# Patient Record
Sex: FEMALE
[De-identification: names, ages, dates, MRNs, and addresses within clinical notes are randomized per-mention and may not be internally consistent; named-entity substitution may affect disease eponyms.]

---

## 2020-11-10 ENCOUNTER — NON-APPOINTMENT (OUTPATIENT)
Age: 25
End: 2020-11-10

## 2020-11-10 PROBLEM — Z00.00 ENCOUNTER FOR PREVENTIVE HEALTH EXAMINATION: Status: ACTIVE | Noted: 2020-11-10

## 2020-11-18 ENCOUNTER — NON-APPOINTMENT (OUTPATIENT)
Age: 25
End: 2020-11-18

## 2020-11-19 ENCOUNTER — APPOINTMENT (OUTPATIENT)
Dept: BREAST CENTER | Facility: CLINIC | Age: 25
End: 2020-11-19
Payer: COMMERCIAL

## 2020-11-19 VITALS
BODY MASS INDEX: 21.77 KG/M2 | OXYGEN SATURATION: 96 % | WEIGHT: 108 LBS | HEART RATE: 70 BPM | DIASTOLIC BLOOD PRESSURE: 78 MMHG | HEIGHT: 59 IN | TEMPERATURE: 98 F | SYSTOLIC BLOOD PRESSURE: 119 MMHG

## 2020-11-19 DIAGNOSIS — Z78.9 OTHER SPECIFIED HEALTH STATUS: ICD-10-CM

## 2020-11-19 PROCEDURE — 99204 OFFICE O/P NEW MOD 45 MIN: CPT

## 2020-11-30 NOTE — PAST MEDICAL HISTORY
[Menarche Age ____] : age at menarche was [unfilled] [Definite ___ (Date)] : the last menstrual period was [unfilled] [Regular Cycle Intervals] : have been regular [Total Preg ___] : G[unfilled] [History of Hormone Replacement Treatment] : has no history of hormone replacement treatment [FreeTextEntry7] : currently using Sprintec (OCP)

## 2020-11-30 NOTE — DATA REVIEWED
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 VUS identified in BARD1.

## 2020-11-30 NOTE — HISTORY OF PRESENT ILLNESS
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 Testing was prompted as her mother had breast cancer at age 33, with multiple recurrences and DOD in her 50's. The patient has not had genetic counseling since testing was completed in March 2020. Pt denies masses, lesions, discharge, or other breast complaints. Denies fever and chills.\par \par Discussed patients high risk status and recommendations for close surveillance. Patient understands and would like to be followed closely. Discussed benefits of surveillance and well as implication of the sensitivity of the MRI. Patient understands and agrees to go forward with MRI for breast cancer surveillance.\par \par \par Discussed risk reducing options. This includes medical oncology consult and possible medication that would lower risk by 50%. As well as risk reducing surgery, discussed the risks and benefits of risk reducing breast surgery including the decrease of breast cancer risk to ~3-5%, length of recover and wound healing complications. Patient understands and would like to move forward with surveillance, is interested in prophylactic surgery and would like to meet with plastic surgery to discuss reconstruction options. \par \par Discussed meeting with a fertility specialist for egg harvesting. Patient would like to schedule a consult. \par \par Discussed her increased risk for thyroid, colon, ovarian and other cancers. Patient understands that she will need to establish with a PCP and start screening as recommended. \par \par Patient would also like to meet with a genetic counselor to have an in depth discussion about her genetic history.\par \par

## 2020-12-18 ENCOUNTER — APPOINTMENT (OUTPATIENT)
Dept: PEDIATRIC MEDICAL GENETICS | Facility: CLINIC | Age: 25
End: 2020-12-18
Payer: COMMERCIAL

## 2020-12-18 PROCEDURE — 99215 OFFICE O/P EST HI 40 MIN: CPT | Mod: 95

## 2021-01-05 ENCOUNTER — TRANSCRIPTION ENCOUNTER (OUTPATIENT)
Age: 26
End: 2021-01-05

## 2021-01-12 ENCOUNTER — APPOINTMENT (OUTPATIENT)
Dept: PLASTIC SURGERY | Facility: CLINIC | Age: 26
End: 2021-01-12
Payer: COMMERCIAL

## 2021-01-12 VITALS — HEART RATE: 58 BPM | WEIGHT: 108 LBS | OXYGEN SATURATION: 98 % | BODY MASS INDEX: 21.77 KG/M2 | HEIGHT: 59 IN

## 2021-01-12 DIAGNOSIS — Z42.1 ENCOUNTER FOR BREAST RECONSTRUCTION FOLLOWING MASTECTOMY: ICD-10-CM

## 2021-01-12 PROCEDURE — 99244 OFF/OP CNSLTJ NEW/EST MOD 40: CPT

## 2021-01-12 PROCEDURE — 99072 ADDL SUPL MATRL&STAF TM PHE: CPT

## 2021-01-12 NOTE — ASSESSMENT
[FreeTextEntry1] : I reviewed with Ms. HAND  in detail the risks, benefits, and alternatives of both implant based breast reconstruction as well as autologous tissue reconstruction. \par \par I reviewed with her in detail the risks, benefits, and alternatives of both implant based breast reconstruction as well as autologous tissue reconstruction. Specifically, I explained to her that an implant reconstruction usually consists of two separate stages with two surgeries spaced about 4 months apart. At the time of the mastectomy, a tissue expander is placed underneath the pectoralis muscle or on op of the pectoralis muscle and is often reinforced with biologic mesh - acellular dermal matrix. We expand the tissue expander secondarily in the office by injecting saline into the implant percutaneously until the desired size breast mound is achieved. At that point a second stage operation is scheduled where we take her back to operating room and remove the tissue expander and place a permanent breast implant.  The permanent prosthesis can be either silicone or saline. The first stage of the reconstruction is approximately 3 hours for one breast and 4-5 hours for a bilateral procedure, with a 1-2 night hospital stay and a four-week recovery.  The second stage surgery is an outpatient procedure with a two-week recovery. I reviewed with her the risks of implant reconstruction including but not limited to bleeding, scarring, implant infection, implant rupture, capsule contracture, implant malposition, asymmetry, contour abnormality, and need for revision surgeries. I also discussed the FDA recommendations for silicone implant rupture screening with MRI. \par \par I also explained that there is a possibility of contour abnormality, asymmetry between the two breasts, and possible need for revision surgery. A surgery on the native contralateral breast to achieve symmetry in the setting of a unilateral mastectomy is usually required and often performed at the time of the implant exchange or nipple reconstruction. The nipple areolar reconstruction is performed at a later date as a separate procedure.\par \par I then reviewed with her the details of autologous tissue reconstruction, specifically using a free flap from her buttocks or upper posterior thigh / inferior gluteal crease region. I reviewed the risks, benefits, and alternatives of a profunda artery  (PAP) flap reconstruction (Or GAP flap)  Specifically, I explained to her that we transplant the skin, the fat, and the blood vessels from the buttocks or posterior upper thigh to the chest wall where we reconnect the artery and the vein using microvascular surgical techniques.This operation is approximately six hours and nine hours for both sides. There is a three day hospitalization and a six-week recovery period.  The risks include of free flap failure, vascular compromise requiring return to the operating room, donor site scarring and potential delayed wound healing, wound dehiscence, suboptimal scarring or asymmetry associated with the donor site.  There is also a possibility of contour abnormality and asymmetry between the two breasts. Finally, nipple areola reconstruction is performed at a later date as a separate procedure. If there is a free flap loss we would likely place a tissue expander at the time returning to the operating room in order to preserve the breast skin envelope and perform a delayed reconstruction.\par

## 2021-01-12 NOTE — CONSULT LETTER
[Consult Letter:] : I had the pleasure of evaluating your patient, [unfilled]. [Please see my note below.] : Please see my note below. [Consult Closing:] : Thank you very much for allowing me to participate in the care of this patient.  If you have any questions, please do not hesitate to contact me. [Sincerely,] : Sincerely, [FreeTextEntry2] : Dr. Sadia Whalen [FreeTextEntry3] : Oren Lerman, MD, FACS\par Cosmetic & Reconstructive Plastic Surgery\par Director, Aesthetic and Reconstructive Breast Surgery Fellowship - [facility]\par Associate Professor of Surgery, [facility]\par Past President, [place] [employer]\par Tel: [phone]\par Fax: [phone]\par [email or web address]\par

## 2021-01-12 NOTE — HISTORY OF PRESENT ILLNESS
[FreeTextEntry1] : 24 y/o CHEK2 positive F and family hx (mom) breast ca referred by Dr. Sadia Whalen to discuss breast reconstruction options following b/l prophylactic mastectomy. Denies any personal hx of breast issues. Her mother was dx with breast cancer at age 33. Mammogram and u/s scheduled for next month. On high risk screening protocol starting now as per Dr. Whalen. \par \par She is a high school social studies

## 2021-01-12 NOTE — PHYSICAL EXAM
[Bra Size: _______] : Bra Size: [unfilled] [de-identified] : small breast mounds, no ptosis, BD 12cm B/L , SN-N 18cm bl, no masses, no nipple discharge, no scars.  [de-identified] : no excess adiposity or skin laxity

## 2021-02-12 ENCOUNTER — RESULT REVIEW (OUTPATIENT)
Age: 26
End: 2021-02-12

## 2021-02-12 ENCOUNTER — APPOINTMENT (OUTPATIENT)
Dept: MAMMOGRAPHY | Facility: CLINIC | Age: 26
End: 2021-02-12
Payer: COMMERCIAL

## 2021-02-12 ENCOUNTER — APPOINTMENT (OUTPATIENT)
Dept: MRI IMAGING | Facility: CLINIC | Age: 26
End: 2021-02-12
Payer: COMMERCIAL

## 2021-02-12 ENCOUNTER — APPOINTMENT (OUTPATIENT)
Dept: ULTRASOUND IMAGING | Facility: CLINIC | Age: 26
End: 2021-02-12
Payer: COMMERCIAL

## 2021-02-12 ENCOUNTER — OUTPATIENT (OUTPATIENT)
Dept: OUTPATIENT SERVICES | Facility: HOSPITAL | Age: 26
LOS: 1 days | End: 2021-02-12

## 2021-02-12 PROCEDURE — 77063 BREAST TOMOSYNTHESIS BI: CPT | Mod: 26

## 2021-02-12 PROCEDURE — 77049 MRI BREAST C-+ W/CAD BI: CPT | Mod: 26

## 2021-02-12 PROCEDURE — 77067 SCR MAMMO BI INCL CAD: CPT | Mod: 26

## 2021-02-12 PROCEDURE — 76641 ULTRASOUND BREAST COMPLETE: CPT | Mod: 26,50

## 2021-02-16 ENCOUNTER — NON-APPOINTMENT (OUTPATIENT)
Age: 26
End: 2021-02-16

## 2021-05-11 ENCOUNTER — NON-APPOINTMENT (OUTPATIENT)
Age: 26
End: 2021-05-11

## 2021-05-20 ENCOUNTER — APPOINTMENT (OUTPATIENT)
Dept: BREAST CENTER | Facility: CLINIC | Age: 26
End: 2021-05-20
Payer: COMMERCIAL

## 2021-05-20 VITALS
HEART RATE: 73 BPM | BODY MASS INDEX: 22.78 KG/M2 | OXYGEN SATURATION: 96 % | TEMPERATURE: 98.6 F | WEIGHT: 113 LBS | HEIGHT: 59 IN | SYSTOLIC BLOOD PRESSURE: 105 MMHG | DIASTOLIC BLOOD PRESSURE: 64 MMHG

## 2021-05-20 DIAGNOSIS — R92.8 OTHER ABNORMAL AND INCONCLUSIVE FINDINGS ON DIAGNOSTIC IMAGING OF BREAST: ICD-10-CM

## 2021-05-20 PROCEDURE — 99213 OFFICE O/P EST LOW 20 MIN: CPT

## 2021-05-21 NOTE — HISTORY OF PRESENT ILLNESS
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 Testing was prompted as her mother had breast cancer at age 33, with multiple recurrences and DOD in her 50's. Pt denies masses, lesions, discharge, or other breast complaints. Denies fever and chills.\par \par She met with Dr. Lerman to discuss reconstruction options; the patient would like to hold off on surgery for now.\par \par \par Discussed meeting with a fertility specialist for egg harvesting. This was also discussed at her last appointment but the patient states she never made the appt w/ fertility. She is still interested in a consult.  \par \par Patient met with the genetic counselor through [facility] in December for the CHEK2 & APC mutation mutation\par \par She has not received the COVID vaccination yet, hesitant.

## 2021-05-21 NOTE — DATA REVIEWED
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 VUS identified in BARD1. \par \par -- 2/12/21 ([facility]) MRI breasts: Negative MRI. Given the presence of probably benign mass in the right breast ultrasound, recommend short-term follow-up right breast ultrasound in 6 month. Rec Ultrasound in 6 months. BI-RADS 3\par \par -- 2/12/21 ([facility]) B/l mmg and US: Negative mammogram with extremely dense breasts. Probably benign 1.2 cm solid appearing mass in the right breast at 8:00 position 2 cm from the nipple. No abnormal enhancement is seen on screening MRI performed on the same day. Recommend short-term follow-up right breast ultrasound in 6 month. BI-RADS 3.

## 2021-05-21 NOTE — REASON FOR VISIT
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

## 2021-05-25 ENCOUNTER — NON-APPOINTMENT (OUTPATIENT)
Age: 26
End: 2021-05-25

## 2021-06-08 ENCOUNTER — APPOINTMENT (OUTPATIENT)
Dept: HUMAN REPRODUCTION | Facility: CLINIC | Age: 26
End: 2021-06-08
Payer: COMMERCIAL

## 2021-06-08 PROCEDURE — 99205 OFFICE O/P NEW HI 60 MIN: CPT | Mod: 95

## 2021-08-11 ENCOUNTER — OUTPATIENT (OUTPATIENT)
Dept: OUTPATIENT SERVICES | Facility: HOSPITAL | Age: 26
LOS: 1 days | End: 2021-08-11

## 2021-08-11 ENCOUNTER — RESULT REVIEW (OUTPATIENT)
Age: 26
End: 2021-08-11

## 2021-08-11 ENCOUNTER — APPOINTMENT (OUTPATIENT)
Dept: ULTRASOUND IMAGING | Facility: CLINIC | Age: 26
End: 2021-08-11
Payer: COMMERCIAL

## 2021-08-11 PROCEDURE — 76642 ULTRASOUND BREAST LIMITED: CPT | Mod: 26,RT

## 2021-08-13 ENCOUNTER — NON-APPOINTMENT (OUTPATIENT)
Age: 26
End: 2021-08-13

## 2021-08-16 ENCOUNTER — APPOINTMENT (OUTPATIENT)
Dept: BREAST CENTER | Facility: CLINIC | Age: 26
End: 2021-08-16
Payer: COMMERCIAL

## 2021-08-16 VITALS — OXYGEN SATURATION: 98 % | WEIGHT: 113 LBS | HEART RATE: 74 BPM | HEIGHT: 59 IN | BODY MASS INDEX: 22.78 KG/M2

## 2021-08-16 PROCEDURE — 99213 OFFICE O/P EST LOW 20 MIN: CPT

## 2021-08-18 NOTE — DATA REVIEWED
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 VUS identified in BARD1. \par \par -- 2/12/21 ([facility]) MRI breasts: Negative MRI. Given the presence of probably benign mass in the right breast ultrasound, recommend short-term follow-up right breast ultrasound in 6 month. Rec Ultrasound in 6 months. BI-RADS 3\par \par -- 2/12/21 ([facility]) B/l mmg and US: Negative mammogram with extremely dense breasts. Probably benign 1.2 cm solid appearing mass in the right breast at 8:00 position 2 cm from the nipple. No abnormal enhancement is seen on screening MRI performed on the same day. Recommend short-term follow-up right breast ultrasound in 6 month. BI-RADS 3. \par \par 8/11/2021 ([facility]) targeted US of right breast showing Targeted ultrasound was performed of the right breast demonstrating no interval change in a benign-appearing nodule at 8:00 2 cm from the nipple measuring 12 mm, which meets criteria for additional follow-up. IMPRESSION: Recommend additional short-term interval sonographic follow-up of benign-appearing solid nodule in the right breast at 8:00 2 cm from the nipple, to optimally document two-year stability. Recommendations were relayed directly to the patient prior to discharge. The patient will be notified of the recommendations with a letter written in lay terms, as per [facility] requirements. RECOMMENDATION: Ultrasound in 6 months. BI-RADS 3-Probably Benign Finding(s)

## 2021-08-18 NOTE — HISTORY OF PRESENT ILLNESS
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 Testing was prompted as her mother had breast cancer at age 33, with multiple recurrences and DOD in her 50's. Pt denies masses, lesions, discharge, or other breast complaints. Denies fever and chills.\par \par Had telehealth with Dr. Kelley to discuss options. \par \par Patient met with the genetic counselor through [facility] in December for the CHEK2 & APC mutation mutation\par \par She has not received the COVID vaccination yet, hesitant.

## 2021-12-14 ENCOUNTER — APPOINTMENT (OUTPATIENT)
Dept: PLASTIC SURGERY | Facility: CLINIC | Age: 26
End: 2021-12-14

## 2022-02-21 ENCOUNTER — OUTPATIENT (OUTPATIENT)
Dept: OUTPATIENT SERVICES | Facility: HOSPITAL | Age: 27
LOS: 1 days | End: 2022-02-21

## 2022-02-21 ENCOUNTER — APPOINTMENT (OUTPATIENT)
Dept: ULTRASOUND IMAGING | Facility: CLINIC | Age: 27
End: 2022-02-21
Payer: COMMERCIAL

## 2022-02-21 ENCOUNTER — RESULT REVIEW (OUTPATIENT)
Age: 27
End: 2022-02-21

## 2022-02-21 ENCOUNTER — APPOINTMENT (OUTPATIENT)
Dept: MAMMOGRAPHY | Facility: CLINIC | Age: 27
End: 2022-02-21
Payer: COMMERCIAL

## 2022-02-21 PROCEDURE — 77066 DX MAMMO INCL CAD BI: CPT | Mod: 26

## 2022-02-21 PROCEDURE — 76642 ULTRASOUND BREAST LIMITED: CPT | Mod: 26,RT

## 2022-02-21 PROCEDURE — 77062 BREAST TOMOSYNTHESIS BI: CPT | Mod: 26

## 2022-02-21 PROCEDURE — G0279: CPT | Mod: 26

## 2022-02-22 ENCOUNTER — APPOINTMENT (OUTPATIENT)
Dept: BREAST CENTER | Facility: CLINIC | Age: 27
End: 2022-02-22
Payer: COMMERCIAL

## 2022-02-22 VITALS
SYSTOLIC BLOOD PRESSURE: 110 MMHG | HEIGHT: 59 IN | HEART RATE: 67 BPM | DIASTOLIC BLOOD PRESSURE: 72 MMHG | WEIGHT: 114 LBS | BODY MASS INDEX: 22.98 KG/M2

## 2022-02-22 PROCEDURE — 99213 OFFICE O/P EST LOW 20 MIN: CPT

## 2022-02-24 ENCOUNTER — APPOINTMENT (OUTPATIENT)
Dept: ULTRASOUND IMAGING | Facility: CLINIC | Age: 27
End: 2022-02-24

## 2022-02-24 ENCOUNTER — APPOINTMENT (OUTPATIENT)
Dept: MAMMOGRAPHY | Facility: CLINIC | Age: 27
End: 2022-02-24

## 2022-02-28 NOTE — HISTORY OF PRESENT ILLNESS
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 Testing was prompted as her mother had breast cancer at age 33, with multiple recurrences and DOD in her 50's. Pt denies masses, lesions, discharge, or other breast complaints. Denies fever and chills.\par \par Had telehealth with Dr. Kelley to discuss options. \par \par Patient met with the genetic counselor through [facility] in December for the CHEK2 & APC mutation.\par \par Patient has had COVID 19 vaccine and booster.

## 2022-02-28 NOTE — DATA REVIEWED
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 VUS identified in BARD1. \par \par -- 2/12/21 ([facility]) MRI breasts: Negative MRI. Given the presence of probably benign mass in the right breast ultrasound, recommend short-term follow-up right breast ultrasound in 6 month. Rec Ultrasound in 6 months. BI-RADS 3\par \par -- 2/12/21 ([facility]) B/l mmg and US: Negative mammogram with extremely dense breasts. Probably benign 1.2 cm solid appearing mass in the right breast at 8:00 position 2 cm from the nipple. No abnormal enhancement is seen on screening MRI performed on the same day. Recommend short-term follow-up right breast ultrasound in 6 month. BI-RADS 3. \par \par 8/11/2021 ([facility]) targeted US of right breast showing Targeted ultrasound was performed of the right breast demonstrating no interval change in a benign-appearing nodule at 8:00 2 cm from the nipple measuring 12 mm, which meets criteria for additional follow-up. IMPRESSION: Recommend additional short-term interval sonographic follow-up of benign-appearing solid nodule in the right breast at 8:00 2 cm from the nipple, to optimally document two-year stability. Recommendations were relayed directly to the patient prior to discharge. The patient will be notified of the recommendations with a letter written in lay terms, as per [facility] requirements. RECOMMENDATION: Ultrasound in 6 months. BI-RADS 3-Probably Benign Finding(s)\par \par Data Reviewed:\par \par 2/21/22: ([facility]) ASH MMG/US: The breasts are extremely dense. \par    Recommendation: Mammography and ultrasound in 1 year, to follow up benign appearing solid nodule in the right breast at 8:00 2 cm FN.\par    BI-RADS 3.\par \par

## 2022-08-02 ENCOUNTER — OUTPATIENT (OUTPATIENT)
Dept: OUTPATIENT SERVICES | Facility: HOSPITAL | Age: 27
LOS: 1 days | End: 2022-08-02

## 2022-08-02 ENCOUNTER — APPOINTMENT (OUTPATIENT)
Dept: MRI IMAGING | Facility: CLINIC | Age: 27
End: 2022-08-02

## 2022-08-02 ENCOUNTER — RESULT REVIEW (OUTPATIENT)
Age: 27
End: 2022-08-02

## 2022-08-02 PROCEDURE — 77049 MRI BREAST C-+ W/CAD BI: CPT | Mod: 26

## 2022-08-16 ENCOUNTER — NON-APPOINTMENT (OUTPATIENT)
Age: 27
End: 2022-08-16

## 2022-08-22 ENCOUNTER — APPOINTMENT (OUTPATIENT)
Dept: BREAST CENTER | Facility: CLINIC | Age: 27
End: 2022-08-22

## 2022-08-22 VITALS — HEIGHT: 59 IN | BODY MASS INDEX: 23.18 KG/M2 | HEART RATE: 75 BPM | OXYGEN SATURATION: 97 % | WEIGHT: 115 LBS

## 2022-08-22 PROCEDURE — 99213 OFFICE O/P EST LOW 20 MIN: CPT

## 2022-08-22 RX ORDER — NORGESTIMATE AND ETHINYL ESTRADIOL 0.25-0.035
KIT ORAL
Refills: 0 | Status: DISCONTINUED | COMMUNITY
End: 2022-08-22

## 2022-08-22 RX ORDER — CETIRIZINE HCL 10 MG
TABLET ORAL
Refills: 0 | Status: DISCONTINUED | COMMUNITY
End: 2022-08-22

## 2022-08-22 NOTE — DATA REVIEWED
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 VUS identified in BARD1. \par \par -- 2/12/21 ([facility]) MRI breasts: Negative MRI. Given the presence of probably benign mass in the right breast ultrasound, recommend short-term follow-up right breast ultrasound in 6 month. Rec Ultrasound in 6 months. BI-RADS 3\par \par -- 2/12/21 ([facility]) B/l mmg and US: Negative mammogram with extremely dense breasts. Probably benign 1.2 cm solid appearing mass in the right breast at 8:00 position 2 cm from the nipple. No abnormal enhancement is seen on screening MRI performed on the same day. Recommend short-term follow-up right breast ultrasound in 6 month. BI-RADS 3. \par \par 8/11/2021 ([facility]) targeted US of right breast showing Targeted ultrasound was performed of the right breast demonstrating no interval change in a benign-appearing nodule at 8:00 2 cm from the nipple measuring 12 mm, which meets criteria for additional follow-up. IMPRESSION: Recommend additional short-term interval sonographic follow-up of benign-appearing solid nodule in the right breast at 8:00 2 cm from the nipple, to optimally document two-year stability. Recommendations were relayed directly to the patient prior to discharge. The patient will be notified of the recommendations with a letter written in lay terms, as per [facility] requirements. RECOMMENDATION: Ultrasound in 6 months. BI-RADS 3-Probably Benign Finding(s)\par \par 2/21/22: ([facility]) ASH MMG/US: The breasts are extremely dense. Recommendation: Mammography and ultrasound in 1 year, to follow up benign appearing solid nodule in the right breast at 8:00 2 cm FN. BI-RADS 3.\par \par 8/2/2022 ([facility]) bilateral breast MRI showing No suspicious enhancement to warrant biopsy. RECOMMENDATION: Resume Annual Mammography on Schedule. BI-RADS 2 - Benign Finding(s)

## 2022-08-22 NOTE — HISTORY OF PRESENT ILLNESS
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 Testing was prompted as her mother had breast cancer at age 33, with multiple recurrences and DOD in her 50's. Pt denies masses, lesions, discharge, or other breast complaints. Denies fever and chills.\par \par Had telehealth with Dr. Kelley to discuss options. \par \par Patient met with the genetic counselor through [facility] in December 2021 for the CHEK2 & APC mutation

## 2023-02-16 ENCOUNTER — OUTPATIENT (OUTPATIENT)
Dept: OUTPATIENT SERVICES | Facility: HOSPITAL | Age: 28
LOS: 1 days | End: 2023-02-16

## 2023-02-16 ENCOUNTER — RESULT REVIEW (OUTPATIENT)
Age: 28
End: 2023-02-16

## 2023-02-16 ENCOUNTER — APPOINTMENT (OUTPATIENT)
Dept: MAMMOGRAPHY | Facility: CLINIC | Age: 28
End: 2023-02-16
Payer: COMMERCIAL

## 2023-02-16 ENCOUNTER — APPOINTMENT (OUTPATIENT)
Dept: ULTRASOUND IMAGING | Facility: CLINIC | Age: 28
End: 2023-02-16
Payer: COMMERCIAL

## 2023-02-16 PROCEDURE — 77067 SCR MAMMO BI INCL CAD: CPT | Mod: 26

## 2023-02-16 PROCEDURE — 76641 ULTRASOUND BREAST COMPLETE: CPT | Mod: 26,RT

## 2023-02-16 PROCEDURE — 77063 BREAST TOMOSYNTHESIS BI: CPT | Mod: 26

## 2023-02-17 ENCOUNTER — APPOINTMENT (OUTPATIENT)
Dept: BREAST CENTER | Facility: CLINIC | Age: 28
End: 2023-02-17
Payer: COMMERCIAL

## 2023-02-17 VITALS
OXYGEN SATURATION: 99 % | DIASTOLIC BLOOD PRESSURE: 61 MMHG | HEIGHT: 59 IN | BODY MASS INDEX: 23.18 KG/M2 | WEIGHT: 115 LBS | SYSTOLIC BLOOD PRESSURE: 108 MMHG | HEART RATE: 69 BPM

## 2023-02-17 DIAGNOSIS — Z15.09 GENETIC SUSCEPTIBILITY TO OTHER MALIGNANT NEOPLASM: ICD-10-CM

## 2023-02-17 DIAGNOSIS — Z91.89 OTHER SPECIFIED PERSONAL RISK FACTORS, NOT ELSEWHERE CLASSIFIED: ICD-10-CM

## 2023-02-17 DIAGNOSIS — Z80.3 FAMILY HISTORY OF MALIGNANT NEOPLASM OF BREAST: ICD-10-CM

## 2023-02-17 PROCEDURE — 99213 OFFICE O/P EST LOW 20 MIN: CPT

## 2023-02-17 NOTE — HISTORY OF PRESENT ILLNESS
[FreeTextEntry1] : Glenna is a 27 year old female, presents for high risk screening with apositive pathogenic mutation in CHEK-2 and a low penetrance variant in APC. Testing was prompted as her mother had breast cancer at age 33, with multiple recurrences and DOD in her 50's. Pt denies masses, lesions, discharge, or other breast complaints. Denies fever and chills.\par \kirk Had telehealth with Dr. Kelley to discuss options; has recently come off birth control. Is interested in having children in the future. She is aware that [facility]/fertility services continue to be available to her. She has a gynecologist who performs ovarian cancer screenings with [facility]; she has a PCP that she follows up with regularly.\par \par Patient met with the genetic counselor through [facility] in December 2021 for the CHEK2 & APC mutation\par \par She will be moving to [place] in the next couple of months; may be moving back to [place] in August to be with her partner but that is still to be decided.

## 2023-02-17 NOTE — PHYSICAL EXAM
[Normocephalic] : normocephalic [No Supraclavicular Adenopathy] : no supraclavicular adenopathy [Examined in the supine and seated position] : examined in the supine and seated position [Symmetrical] : symmetrical [No dominant masses] : no dominant masses in right breast  [No dominant masses] : no dominant masses left breast [No Nipple Retraction] : no left nipple retraction [No Nipple Discharge] : no left nipple discharge [Breast Nipple Inversion] : nipples not inverted [Breast Nipple Retraction] : nipples not retracted [Breast Nipple Flattening] : nipples not flattened [Breast Nipple Fissures] : nipples not fissured [No Axillary Lymphadenopathy] : no left axillary lymphadenopathy [No Edema] : no edema [No Rashes] : no rashes [No Ulceration] : no ulceration

## 2023-02-17 NOTE — ASSESSMENT
[FreeTextEntry1] : 26 yo female with a genetic predisposition to cancer (mutations in CHEK2 and APC) present for high risk screening. Discussed with the patient the implications of their lifetime risk, which is considered to be elevated for the development of breast cancer over the span of their lifetime. It was explained that it is recommended that they undergo high risk screening surveillance to include biannual radiological screening exams with a mammogram and screening MRI. Reviewed the standard lifestyle modifications for risk reduction including to limit alcohol intake, follow a low-fat diet, and maintaining a healthy weight.\par \par Patient will schedule MRI for August this summer, when she knows whether or not she will be in [place] or [place] at that time. Will provide her with a script. Advised her to inform us if she needs help scheduling the appointment in [facility], or if she ends up doing it in [place] so that we can get authorization. If she stays in [place] longer term, she will need to establish care with a high risk program there. Patient understands, is in agreement with the plan.

## 2023-02-17 NOTE — REVIEW OF SYSTEMS
[Fever] : no fever [Chills] : no chills [Breast Pain] : no breast pain [Breast Lump] : no breast lump [Negative] : Heme/Lymph

## 2023-02-17 NOTE — DATA REVIEWED
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 VUS identified in BARD1. \par \par -- 2/12/21 ([facility]) MRI breasts: Negative MRI. Given the presence of probably benign mass in the right breast ultrasound, recommend short-term follow-up right breast ultrasound in 6 month. Rec Ultrasound in 6 months. BI-RADS 3\par \par -- 2/12/21 ([facility]) B/l mmg and US: Negative mammogram with extremely dense breasts. Probably benign 1.2 cm solid appearing mass in the right breast at 8:00 position 2 cm from the nipple. No abnormal enhancement is seen on screening MRI performed on the same day. Recommend short-term follow-up right breast ultrasound in 6 month. BI-RADS 3. \par \par 8/11/2021 ([facility]) targeted US of right breast showing Targeted ultrasound was performed of the right breast demonstrating no interval change in a benign-appearing nodule at 8:00 2 cm from the nipple measuring 12 mm, which meets criteria for additional follow-up. IMPRESSION: Recommend additional short-term interval sonographic follow-up of benign-appearing solid nodule in the right breast at 8:00 2 cm from the nipple, to optimally document two-year stability. Recommendations were relayed directly to the patient prior to discharge. The patient will be notified of the recommendations with a letter written in lay terms, as per [facility] requirements. RECOMMENDATION: Ultrasound in 6 months. BI-RADS 3-Probably Benign Finding(s)\par \par 2/21/22: ([facility]) ASH MMG/US: The breasts are extremely dense. Recommendation: Mammography and ultrasound in 1 year, to follow up benign appearing solid nodule in the right breast at 8:00 2 cm FN. BI-RADS 3.\par \par 8/2/2022 ([facility]) bilateral breast MRI showing No suspicious enhancement to warrant biopsy. RECOMMENDATION: Resume Annual Mammography on Schedule. BI-RADS 2 - Benign Finding(s)\par \par 2/16/23 ([facility]) sMmg and US: extremely dense. No mammographic or sonographic evidence of malignancy. BI-RADS 2.

## 2023-02-17 NOTE — PAST MEDICAL HISTORY
[Menarche Age ____] : age at menarche was [unfilled] [Total Preg ___] : G[unfilled] [History of Hormone Replacement Treatment] : has no history of hormone replacement treatment [Normal Amount/Duration] : it was of a normal amount and duration [Irregular Cycle Intervals] : are  irregular [FreeTextEntry4] : somewhat irregular since stopping OCP [FreeTextEntry7] : recently stopped OCP

## 2023-02-17 NOTE — REASON FOR VISIT
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

## 2023-08-18 ENCOUNTER — APPOINTMENT (OUTPATIENT)
Dept: BREAST CENTER | Facility: CLINIC | Age: 28
End: 2023-08-18